# Patient Record
Sex: MALE | Race: WHITE | NOT HISPANIC OR LATINO
[De-identification: names, ages, dates, MRNs, and addresses within clinical notes are randomized per-mention and may not be internally consistent; named-entity substitution may affect disease eponyms.]

---

## 2017-04-20 PROBLEM — Z00.00 ENCOUNTER FOR PREVENTIVE HEALTH EXAMINATION: Status: ACTIVE | Noted: 2017-04-20

## 2017-05-02 ENCOUNTER — APPOINTMENT (OUTPATIENT)
Dept: OTOLARYNGOLOGY | Facility: CLINIC | Age: 62
End: 2017-05-02

## 2017-05-02 VITALS
SYSTOLIC BLOOD PRESSURE: 151 MMHG | DIASTOLIC BLOOD PRESSURE: 97 MMHG | WEIGHT: 190 LBS | HEART RATE: 75 BPM | HEIGHT: 72 IN | BODY MASS INDEX: 25.73 KG/M2

## 2017-05-02 DIAGNOSIS — Z80.0 FAMILY HISTORY OF MALIGNANT NEOPLASM OF DIGESTIVE ORGANS: ICD-10-CM

## 2017-05-02 DIAGNOSIS — Z80.51 FAMILY HISTORY OF MALIGNANT NEOPLASM OF KIDNEY: ICD-10-CM

## 2017-05-02 DIAGNOSIS — Z83.3 FAMILY HISTORY OF DIABETES MELLITUS: ICD-10-CM

## 2017-05-02 DIAGNOSIS — Z87.891 PERSONAL HISTORY OF NICOTINE DEPENDENCE: ICD-10-CM

## 2017-05-02 RX ORDER — ASPIRIN 81 MG
81 TABLET, DELAYED RELEASE (ENTERIC COATED) ORAL
Refills: 0 | Status: ACTIVE | COMMUNITY

## 2017-05-02 RX ORDER — SIMVASTATIN 20 MG/1
20 TABLET, FILM COATED ORAL
Refills: 0 | Status: ACTIVE | COMMUNITY

## 2017-05-06 ENCOUNTER — APPOINTMENT (OUTPATIENT)
Dept: ULTRASOUND IMAGING | Facility: CLINIC | Age: 62
End: 2017-05-06

## 2017-05-06 ENCOUNTER — OUTPATIENT (OUTPATIENT)
Dept: OUTPATIENT SERVICES | Facility: HOSPITAL | Age: 62
LOS: 1 days | End: 2017-05-06
Payer: COMMERCIAL

## 2017-05-06 DIAGNOSIS — E04.2 NONTOXIC MULTINODULAR GOITER: ICD-10-CM

## 2017-05-06 PROCEDURE — 76536 US EXAM OF HEAD AND NECK: CPT

## 2017-06-01 ENCOUNTER — RESULT REVIEW (OUTPATIENT)
Age: 62
End: 2017-06-01

## 2017-06-26 ENCOUNTER — RESULT REVIEW (OUTPATIENT)
Age: 62
End: 2017-06-26

## 2018-06-28 ENCOUNTER — APPOINTMENT (OUTPATIENT)
Dept: OTOLARYNGOLOGY | Facility: CLINIC | Age: 63
End: 2018-06-28
Payer: COMMERCIAL

## 2018-06-28 VITALS
HEART RATE: 65 BPM | SYSTOLIC BLOOD PRESSURE: 133 MMHG | WEIGHT: 195 LBS | BODY MASS INDEX: 26.41 KG/M2 | DIASTOLIC BLOOD PRESSURE: 86 MMHG | HEIGHT: 72 IN

## 2018-06-28 PROCEDURE — 99213 OFFICE O/P EST LOW 20 MIN: CPT

## 2018-07-01 ENCOUNTER — FORM ENCOUNTER (OUTPATIENT)
Age: 63
End: 2018-07-01

## 2018-07-02 ENCOUNTER — APPOINTMENT (OUTPATIENT)
Dept: ULTRASOUND IMAGING | Facility: CLINIC | Age: 63
End: 2018-07-02
Payer: COMMERCIAL

## 2018-07-02 ENCOUNTER — OUTPATIENT (OUTPATIENT)
Dept: OUTPATIENT SERVICES | Facility: HOSPITAL | Age: 63
LOS: 1 days | End: 2018-07-02
Payer: COMMERCIAL

## 2018-07-02 DIAGNOSIS — E04.2 NONTOXIC MULTINODULAR GOITER: ICD-10-CM

## 2018-07-02 PROCEDURE — 76536 US EXAM OF HEAD AND NECK: CPT | Mod: 26

## 2018-07-02 PROCEDURE — 76536 US EXAM OF HEAD AND NECK: CPT

## 2018-07-12 ENCOUNTER — RESULT REVIEW (OUTPATIENT)
Age: 63
End: 2018-07-12

## 2019-06-18 ENCOUNTER — APPOINTMENT (OUTPATIENT)
Dept: OTOLARYNGOLOGY | Facility: CLINIC | Age: 64
End: 2019-06-18
Payer: COMMERCIAL

## 2019-06-18 VITALS
SYSTOLIC BLOOD PRESSURE: 125 MMHG | WEIGHT: 198 LBS | DIASTOLIC BLOOD PRESSURE: 86 MMHG | HEIGHT: 72 IN | HEART RATE: 70 BPM | BODY MASS INDEX: 26.82 KG/M2

## 2019-06-18 DIAGNOSIS — K21.9 GASTRO-ESOPHAGEAL REFLUX DISEASE W/OUT ESOPHAGITIS: ICD-10-CM

## 2019-06-18 PROCEDURE — 99213 OFFICE O/P EST LOW 20 MIN: CPT

## 2019-06-18 RX ORDER — PREDNISONE 20 MG/1
20 TABLET ORAL
Qty: 4 | Refills: 0 | Status: DISCONTINUED | COMMUNITY
Start: 2019-02-27

## 2019-06-18 RX ORDER — CEFUROXIME AXETIL 500 MG/1
500 TABLET ORAL
Qty: 20 | Refills: 0 | Status: DISCONTINUED | COMMUNITY
Start: 2019-05-23

## 2019-06-18 RX ORDER — AMOXICILLIN 875 MG/1
875 TABLET, FILM COATED ORAL
Qty: 20 | Refills: 0 | Status: DISCONTINUED | COMMUNITY
Start: 2019-02-27

## 2019-06-18 NOTE — REASON FOR VISIT
[Subsequent Evaluation] : a subsequent evaluation for [Other: _____] : [unfilled] [FreeTextEntry2] : follow up for annual check up, history of thyroid nodule and laryngopharyngeal reflux, s/p thyroid sonogram 7/02/18.

## 2019-06-18 NOTE — PHYSICAL EXAM
[de-identified] : Thyroid nodule remains nonpalpable [de-identified] : IDL: OP, HP, and larynx WNL with normal VC mobility.  No masses. [Midline] : trachea located in midline position [Normal] : no rashes

## 2019-06-18 NOTE — CONSULT LETTER
[Dear  ___] : Dear  [unfilled], [Courtesy Letter:] : I had the pleasure of seeing your patient, [unfilled], in my office today. [Please see my note below.] : Please see my note below. [Sincerely,] : Sincerely, [Consult Closing:] : Thank you very much for allowing me to participate in the care of this patient.  If you have any questions, please do not hesitate to contact me. [FreeTextEntry2] : Dr. Ranulfo Beth MD [FreeTextEntry3] : Fabian Pa MD, FACS\par Clinical \par Dept. of Otolaryngology\par [facility]\par

## 2019-06-18 NOTE — HISTORY OF PRESENT ILLNESS
[de-identified] : 63 year old male follow up for annual check up, history of thyroid nodule and laryngopharyngeal reflux, s/p thyroid sonogram 7/02/18.  Patient states he is doing well, reports productive cough in the mornings, no significant change from before.  Denies dysphagia, odynophagia, throat pain, bleeding, hemoptysis, dyspnea and fevers.  Continues to maintain diet, tolerating eating and drinking without choking/aspiration.

## 2019-06-21 ENCOUNTER — FORM ENCOUNTER (OUTPATIENT)
Age: 64
End: 2019-06-21

## 2019-06-22 ENCOUNTER — APPOINTMENT (OUTPATIENT)
Dept: ULTRASOUND IMAGING | Facility: CLINIC | Age: 64
End: 2019-06-22
Payer: COMMERCIAL

## 2019-06-22 ENCOUNTER — OUTPATIENT (OUTPATIENT)
Dept: OUTPATIENT SERVICES | Facility: HOSPITAL | Age: 64
LOS: 1 days | End: 2019-06-22
Payer: COMMERCIAL

## 2019-06-22 DIAGNOSIS — Z00.8 ENCOUNTER FOR OTHER GENERAL EXAMINATION: ICD-10-CM

## 2019-06-22 PROCEDURE — 76536 US EXAM OF HEAD AND NECK: CPT | Mod: 26

## 2019-06-22 PROCEDURE — 76536 US EXAM OF HEAD AND NECK: CPT

## 2019-07-09 ENCOUNTER — RESULT REVIEW (OUTPATIENT)
Age: 64
End: 2019-07-09

## 2020-01-02 ENCOUNTER — EMERGENCY (EMERGENCY)
Facility: HOSPITAL | Age: 65
LOS: 1 days | Discharge: HOME | End: 2020-01-02
Attending: EMERGENCY MEDICINE | Admitting: INTERNAL MEDICINE
Payer: COMMERCIAL

## 2020-01-02 VITALS
RESPIRATION RATE: 16 BRPM | TEMPERATURE: 98 F | OXYGEN SATURATION: 97 % | HEIGHT: 72 IN | WEIGHT: 199.96 LBS | DIASTOLIC BLOOD PRESSURE: 105 MMHG | SYSTOLIC BLOOD PRESSURE: 180 MMHG | HEART RATE: 82 BPM

## 2020-01-02 LAB
ALBUMIN SERPL ELPH-MCNC: 3.6 G/DL — SIGNIFICANT CHANGE UP (ref 3.3–5)
ALP SERPL-CCNC: 61 U/L — SIGNIFICANT CHANGE UP (ref 40–120)
ALT FLD-CCNC: 35 U/L — SIGNIFICANT CHANGE UP (ref 12–78)
ANION GAP SERPL CALC-SCNC: 8 MMOL/L — SIGNIFICANT CHANGE UP (ref 5–17)
APTT BLD: 31.6 SEC — SIGNIFICANT CHANGE UP (ref 28.5–37)
AST SERPL-CCNC: 24 U/L — SIGNIFICANT CHANGE UP (ref 15–37)
BASOPHILS # BLD AUTO: 0.04 K/UL — SIGNIFICANT CHANGE UP (ref 0–0.2)
BASOPHILS NFR BLD AUTO: 0.6 % — SIGNIFICANT CHANGE UP (ref 0–2)
BILIRUB SERPL-MCNC: 0.3 MG/DL — SIGNIFICANT CHANGE UP (ref 0.2–1.2)
BUN SERPL-MCNC: 18 MG/DL — SIGNIFICANT CHANGE UP (ref 7–23)
CALCIUM SERPL-MCNC: 8.7 MG/DL — SIGNIFICANT CHANGE UP (ref 8.5–10.1)
CHLORIDE SERPL-SCNC: 112 MMOL/L — HIGH (ref 96–108)
CO2 SERPL-SCNC: 26 MMOL/L — SIGNIFICANT CHANGE UP (ref 22–31)
CREAT SERPL-MCNC: 0.8 MG/DL — SIGNIFICANT CHANGE UP (ref 0.5–1.3)
EOSINOPHIL # BLD AUTO: 0.36 K/UL — SIGNIFICANT CHANGE UP (ref 0–0.5)
EOSINOPHIL NFR BLD AUTO: 5.8 % — SIGNIFICANT CHANGE UP (ref 0–6)
GLUCOSE SERPL-MCNC: 149 MG/DL — HIGH (ref 70–99)
HCT VFR BLD CALC: 42.8 % — SIGNIFICANT CHANGE UP (ref 39–50)
HGB BLD-MCNC: 14.8 G/DL — SIGNIFICANT CHANGE UP (ref 13–17)
IMM GRANULOCYTES NFR BLD AUTO: 0.2 % — SIGNIFICANT CHANGE UP (ref 0–1.5)
INR BLD: 0.97 RATIO — SIGNIFICANT CHANGE UP (ref 0.88–1.16)
LIDOCAIN IGE QN: 110 U/L — SIGNIFICANT CHANGE UP (ref 73–393)
LYMPHOCYTES # BLD AUTO: 2.55 K/UL — SIGNIFICANT CHANGE UP (ref 1–3.3)
LYMPHOCYTES # BLD AUTO: 41.4 % — SIGNIFICANT CHANGE UP (ref 13–44)
MAGNESIUM SERPL-MCNC: 2.1 MG/DL — SIGNIFICANT CHANGE UP (ref 1.6–2.6)
MCHC RBC-ENTMCNC: 29.2 PG — SIGNIFICANT CHANGE UP (ref 27–34)
MCHC RBC-ENTMCNC: 34.6 GM/DL — SIGNIFICANT CHANGE UP (ref 32–36)
MCV RBC AUTO: 84.6 FL — SIGNIFICANT CHANGE UP (ref 80–100)
MONOCYTES # BLD AUTO: 0.6 K/UL — SIGNIFICANT CHANGE UP (ref 0–0.9)
MONOCYTES NFR BLD AUTO: 9.7 % — SIGNIFICANT CHANGE UP (ref 2–14)
NEUTROPHILS # BLD AUTO: 2.6 K/UL — SIGNIFICANT CHANGE UP (ref 1.8–7.4)
NEUTROPHILS NFR BLD AUTO: 42.3 % — LOW (ref 43–77)
NRBC # BLD: 0 /100 WBCS — SIGNIFICANT CHANGE UP (ref 0–0)
PLATELET # BLD AUTO: 182 K/UL — SIGNIFICANT CHANGE UP (ref 150–400)
POTASSIUM SERPL-MCNC: 3.7 MMOL/L — SIGNIFICANT CHANGE UP (ref 3.5–5.3)
POTASSIUM SERPL-SCNC: 3.7 MMOL/L — SIGNIFICANT CHANGE UP (ref 3.5–5.3)
PROT SERPL-MCNC: 6.9 G/DL — SIGNIFICANT CHANGE UP (ref 6–8.3)
PROTHROM AB SERPL-ACNC: 11 SEC — SIGNIFICANT CHANGE UP (ref 10–12.9)
RBC # BLD: 5.06 M/UL — SIGNIFICANT CHANGE UP (ref 4.2–5.8)
RBC # FLD: 13.2 % — SIGNIFICANT CHANGE UP (ref 10.3–14.5)
SODIUM SERPL-SCNC: 146 MMOL/L — HIGH (ref 135–145)
TROPONIN I SERPL-MCNC: <.015 NG/ML — SIGNIFICANT CHANGE UP (ref 0.01–0.04)
WBC # BLD: 6.16 K/UL — SIGNIFICANT CHANGE UP (ref 3.8–10.5)
WBC # FLD AUTO: 6.16 K/UL — SIGNIFICANT CHANGE UP (ref 3.8–10.5)

## 2020-01-02 PROCEDURE — 93010 ELECTROCARDIOGRAM REPORT: CPT

## 2020-01-02 PROCEDURE — 99283 EMERGENCY DEPT VISIT LOW MDM: CPT

## 2020-01-02 PROCEDURE — 70450 CT HEAD/BRAIN W/O DYE: CPT | Mod: 26

## 2020-01-02 PROCEDURE — 71045 X-RAY EXAM CHEST 1 VIEW: CPT | Mod: 26

## 2020-01-02 RX ORDER — SIMVASTATIN 20 MG/1
1 TABLET, FILM COATED ORAL
Qty: 0 | Refills: 0 | DISCHARGE

## 2020-01-02 RX ORDER — ASPIRIN/CALCIUM CARB/MAGNESIUM 324 MG
1 TABLET ORAL
Qty: 0 | Refills: 0 | DISCHARGE

## 2020-01-02 RX ORDER — SODIUM CHLORIDE 9 MG/ML
3 INJECTION INTRAMUSCULAR; INTRAVENOUS; SUBCUTANEOUS ONCE
Refills: 0 | Status: COMPLETED | OUTPATIENT
Start: 2020-01-02 | End: 2020-01-02

## 2020-01-02 RX ADMIN — SODIUM CHLORIDE 3 MILLILITER(S): 9 INJECTION INTRAMUSCULAR; INTRAVENOUS; SUBCUTANEOUS at 22:56

## 2020-01-02 NOTE — ED PROVIDER NOTE - CARE PROVIDER_API CALL
Orlando Prince)  Cardiovascular Disease  [address], [address] [place] [address]  Phone: [phone]  Fax: [phone]  Follow Up Time: 1-3 Days

## 2020-01-02 NOTE — ED PROVIDER NOTE - CARDIAC, MLM
Normal rate, regular rhythm.  Heart sounds S1, S2.  No murmurs, rubs or gallops. No reproducible TTP on exam, no skin changes noted

## 2020-01-02 NOTE — ED PROVIDER NOTE - CARE PLAN
Principal Discharge DX:	Nonspecific chest pain  Secondary Diagnosis:	Headache  Secondary Diagnosis:	HTN (hypertension)

## 2020-01-02 NOTE — ED PROVIDER NOTE - PROGRESS NOTE DETAILS
The patients headache resolved BP after Norvasc 5mg is  155/93, Plan discharge and f/u with cardiology

## 2020-01-02 NOTE — ED PROVIDER NOTE - CLINICAL SUMMARY MEDICAL DECISION MAKING FREE TEXT BOX
Pt is a 65 yo male who presents to the ED with a cc of chest pain.  PMHx of HLD.  Pt reports that he has no CAD.  Over the last week he has been experiencing intermittent left sided chest pain.  Symptoms were self limited and he reports that he noted them mainly at rest not with exertion.  Pt believed that they were related to a chest wall strain due to the fact that he had similar pain in the past.  Denies any heavy lifting or trauma.  He reports that tonight around 6 pm he noted the pain again but this time it did not resolve.  He then reports that he developed a mild generalized HA.  he became concerned when he developed the HA and when he check his BP he noted that it was elevated with his systolic in the 170s and his systolic in the 100s.  Pt does not have a history of HTN.  Pt became concerned and came to the ED for further work up.  Denies fever, chills, N/V, SOB, abd pain.  Denies visual changes.  Pt reports that the pain is increased with movement and with deep breathing.  Denies any recent travel or long car rides.  Denies calf pain or swelling. Pt is a 63 yo male who presents to the ED with a cc of chest pain.  PMHx of HLD.  Pt reports that he has no CAD.  Over the last week he has been experiencing intermittent left sided chest pain.  Symptoms were self limited and he reports that he noted them mainly at rest not with exertion.  Pt believed that they were related to a chest wall strain due to the fact that he had similar pain in the past.  Denies any heavy lifting or trauma.  He reports that tonight around 6 pm he noted the pain again but this time it did not resolve.  He then reports that he developed a mild generalized HA.  he became concerned when he developed the HA and when he check his BP he noted that it was elevated with his systolic in the 170s and his systolic in the 100s.  Pt does not have a history of HTN.  Pt became concerned and came to the ED for further work up.  Denies fever, chills, N/V, SOB, abd pain.  Denies visual changes.  Pt reports that the pain is increased with movement and with deep breathing.  Denies any recent travel or long car rides.  Denies calf pain or swelling.  Concern for underlying cardiac abnormality will obtain screening labs, control BP, obtain EKG and monitor.  Will check CT head due to HA

## 2020-01-02 NOTE — ED ADULT NURSE NOTE - OBJECTIVE STATEMENT
Present to ER with c/o of chest pain and head ache since 6pm. Denies any trauma in the area. Denies any shortness of breath.

## 2020-01-02 NOTE — ED PROVIDER NOTE - PATIENT PORTAL LINK FT
You can access the FollowMyHealth Patient Portal offered by [facility] by registering at the following website: [email or web address]. By joining [employer]’s FollowMyHealth portal, you will also be able to view your health information using other applications (apps) compatible with our system.

## 2020-01-02 NOTE — ED PROVIDER NOTE - OBJECTIVE STATEMENT
Pt is a 65 yo male who presents to the ED with a cc of chest pain.  PMHx of HLD.  Pt reports that he has no CAD.  Over the last week he has been experiencing intermittent left sided chest pain.  Symptoms were self limited and he reports that he noted them mainly at rest not with exertion.  Pt believed that they were related to a chest wall strain due to the fact that he had similar pain in the past.  Denies any heavy lifting or trauma.  He reports that tonight around 6 pm he noted the pain again but this time it did not resolve.  He then reports that he developed a mild generalized HA.  he became concerned when he developed the HA and when he check his BP he noted that it was elevated with his systolic in the 170s and his systolic in the 100s.  Pt does not have a history of HTN.  Pt became concerned and came to the ED for further work up.  Denies fever, chills, N/V, SOB, abd pain.  Denies visual changes.  Pt reports that the pain is increased with movement and with deep breathing.  Denies any recent travel or long car rides.  Denies calf pain or swelling.

## 2020-01-03 VITALS
SYSTOLIC BLOOD PRESSURE: 155 MMHG | HEART RATE: 79 BPM | TEMPERATURE: 98 F | RESPIRATION RATE: 16 BRPM | OXYGEN SATURATION: 96 % | DIASTOLIC BLOOD PRESSURE: 93 MMHG

## 2020-01-03 LAB
D DIMER BLD IA.RAPID-MCNC: <150 NG/ML DDU — SIGNIFICANT CHANGE UP
TROPONIN I SERPL-MCNC: <.015 NG/ML — SIGNIFICANT CHANGE UP (ref 0.01–0.04)

## 2020-01-03 PROCEDURE — 96374 THER/PROPH/DIAG INJ IV PUSH: CPT

## 2020-01-03 PROCEDURE — 85027 COMPLETE CBC AUTOMATED: CPT

## 2020-01-03 PROCEDURE — 36415 COLL VENOUS BLD VENIPUNCTURE: CPT

## 2020-01-03 PROCEDURE — 83735 ASSAY OF MAGNESIUM: CPT

## 2020-01-03 PROCEDURE — 71045 X-RAY EXAM CHEST 1 VIEW: CPT

## 2020-01-03 PROCEDURE — 84484 ASSAY OF TROPONIN QUANT: CPT

## 2020-01-03 PROCEDURE — 80053 COMPREHEN METABOLIC PANEL: CPT

## 2020-01-03 PROCEDURE — 83690 ASSAY OF LIPASE: CPT

## 2020-01-03 PROCEDURE — 85610 PROTHROMBIN TIME: CPT

## 2020-01-03 PROCEDURE — 70450 CT HEAD/BRAIN W/O DYE: CPT

## 2020-01-03 PROCEDURE — 93005 ELECTROCARDIOGRAM TRACING: CPT

## 2020-01-03 PROCEDURE — 85730 THROMBOPLASTIN TIME PARTIAL: CPT

## 2020-01-03 PROCEDURE — 99284 EMERGENCY DEPT VISIT MOD MDM: CPT | Mod: 25

## 2020-01-03 PROCEDURE — 85379 FIBRIN DEGRADATION QUANT: CPT

## 2020-01-03 RX ORDER — AMLODIPINE BESYLATE 2.5 MG/1
5 TABLET ORAL ONCE
Refills: 0 | Status: COMPLETED | OUTPATIENT
Start: 2020-01-03 | End: 2020-01-03

## 2020-01-03 RX ORDER — KETOROLAC TROMETHAMINE 30 MG/ML
30 SYRINGE (ML) INJECTION ONCE
Refills: 0 | Status: DISCONTINUED | OUTPATIENT
Start: 2020-01-03 | End: 2020-01-03

## 2020-01-03 RX ADMIN — Medication 30 MILLIGRAM(S): at 00:52

## 2020-01-03 RX ADMIN — Medication 30 MILLIGRAM(S): at 01:15

## 2020-01-03 RX ADMIN — AMLODIPINE BESYLATE 5 MILLIGRAM(S): 2.5 TABLET ORAL at 02:33

## 2020-01-03 NOTE — ED ADULT NURSE REASSESSMENT NOTE - NS ED NURSE REASSESS COMMENT FT1
Patient is aware of pending CT results and troponin redraw at 2AM.  Patient verbalizes his anxiety and denies being on anti-hypertensive medications at home but does admit to high salt intake over the past few days.  Stretcher in the lowest position with family member at the bedside.  Safety maintained.

## 2021-06-14 PROBLEM — E78.00 PURE HYPERCHOLESTEROLEMIA, UNSPECIFIED: Chronic | Status: ACTIVE | Noted: 2020-01-02

## 2021-06-15 ENCOUNTER — APPOINTMENT (OUTPATIENT)
Dept: OTOLARYNGOLOGY | Facility: CLINIC | Age: 66
End: 2021-06-15
Payer: COMMERCIAL

## 2021-06-15 VITALS
BODY MASS INDEX: 25.06 KG/M2 | SYSTOLIC BLOOD PRESSURE: 133 MMHG | WEIGHT: 185 LBS | HEIGHT: 72 IN | DIASTOLIC BLOOD PRESSURE: 84 MMHG | HEART RATE: 76 BPM

## 2021-06-15 DIAGNOSIS — E04.2 NONTOXIC MULTINODULAR GOITER: ICD-10-CM

## 2021-06-15 DIAGNOSIS — K09.8 OTHER CYSTS OF ORAL REGION, NOT ELSEWHERE CLASSIFIED: ICD-10-CM

## 2021-06-15 PROCEDURE — 99072 ADDL SUPL MATRL&STAF TM PHE: CPT

## 2021-06-15 PROCEDURE — 99214 OFFICE O/P EST MOD 30 MIN: CPT

## 2021-06-15 NOTE — CONSULT LETTER
[Dear  ___] : Dear  [unfilled], [Courtesy Letter:] : I had the pleasure of seeing your patient, [unfilled], in my office today. [Please see my note below.] : Please see my note below. [Sincerely,] : Sincerely, [DrJustin  ___] : Dr. SEVERINO [FreeTextEntry2] : Ranulfo Beth, DO [FreeTextEntry3] : Fabian Pa MD, FACS\par Chief of Otolaryngology at [facility]\par \par Dept. of Otolaryngology\par [facility]\par

## 2021-06-15 NOTE — PHYSICAL EXAM
[Midline] : trachea located in midline position [Normal] : no rashes [de-identified] : Thyroid nodule remains nonpalpable [de-identified] : 2 small cystic lesions (1 to 2 mm) in L RMT region.

## 2021-06-15 NOTE — HISTORY OF PRESENT ILLNESS
[de-identified] : 65 year old male follow up for thyroid nodule, and two vesicles in left side of mouth.  No imaging conducted since June 2019 for thyroid nodule.  Patient denies dysphagia, odynophagia, dyspnea, dysphonia or otalgia. States has 2 vesicles in the left side of the mouth behind molars.  States comes and goes, has been present for about 2 months.  Sometimes gets sore when it gets irritated, then shrinks and returns.

## 2021-06-21 ENCOUNTER — OUTPATIENT (OUTPATIENT)
Dept: OUTPATIENT SERVICES | Facility: HOSPITAL | Age: 66
LOS: 1 days | End: 2021-06-21
Payer: COMMERCIAL

## 2021-06-21 ENCOUNTER — APPOINTMENT (OUTPATIENT)
Dept: ULTRASOUND IMAGING | Facility: CLINIC | Age: 66
End: 2021-06-21
Payer: COMMERCIAL

## 2021-06-21 ENCOUNTER — RESULT REVIEW (OUTPATIENT)
Age: 66
End: 2021-06-21

## 2021-06-21 DIAGNOSIS — Z00.8 ENCOUNTER FOR OTHER GENERAL EXAMINATION: ICD-10-CM

## 2021-06-21 DIAGNOSIS — E04.2 NONTOXIC MULTINODULAR GOITER: ICD-10-CM

## 2021-06-21 PROCEDURE — 76536 US EXAM OF HEAD AND NECK: CPT

## 2021-06-21 PROCEDURE — 76536 US EXAM OF HEAD AND NECK: CPT | Mod: 26

## 2021-06-22 ENCOUNTER — APPOINTMENT (OUTPATIENT)
Dept: OTOLARYNGOLOGY | Facility: CLINIC | Age: 66
End: 2021-06-22

## 2021-07-17 ENCOUNTER — NON-APPOINTMENT (OUTPATIENT)
Age: 66
End: 2021-07-17

## 2021-11-21 NOTE — ED ADULT NURSE NOTE - NSFALLRSKUNASSIST_ED_ALL_ED
Martell is a 19 year old who is being evaluated via a billable telephone visit.      Assessment & Plan     Diarrhea, unspecified type    Symptoms have resolved and patient never had evidence consistent with Covid. He can return to work without further evaluation.     Janett Jerez PA-C  Virtual Urgent Care  [facility] VIRTUAL URGENT CARE    Subjective   Martell is a 19 year old who presents for the following health issues : Covid concern    HPI - Patient is having a telephone visit due to upset stomach and diarrhea. He had diarrhea the last few days. He has not had fevers, chills or cold symptoms. He called in to work due to stomach upset. He says his symptoms have resolved.     Review of Systems   Constitutional, HEENT, cardiovascular, pulmonary, gi and gu systems are negative, except as otherwise noted.      Objective           Vitals:  No vitals were obtained today due to virtual visit.    Physical Exam   healthy, alert and no distress  PSYCH: Alert and oriented times 3; coherent speech, normal   rate and volume, able to articulate logical thoughts, able   to abstract reason, no tangential thoughts, no hallucinations   or delusions  His affect is normal  RESP: No cough, no audible wheezing, able to talk in full sentences  Remainder of exam unable to be completed due to telephone visits      Phone call duration: 6 minutes     no

## 2022-10-02 ENCOUNTER — OFFICE (OUTPATIENT)
Dept: URBAN - METROPOLITAN AREA CLINIC 109 | Facility: CLINIC | Age: 67
Setting detail: OPHTHALMOLOGY
End: 2022-10-02
Payer: COMMERCIAL

## 2022-10-02 DIAGNOSIS — H02.831: ICD-10-CM

## 2022-10-02 DIAGNOSIS — H02.834: ICD-10-CM

## 2022-10-02 DIAGNOSIS — H02.832: ICD-10-CM

## 2022-10-02 DIAGNOSIS — H10.32: ICD-10-CM

## 2022-10-02 DIAGNOSIS — H02.835: ICD-10-CM

## 2022-10-02 PROCEDURE — 92002 INTRM OPH EXAM NEW PATIENT: CPT | Performed by: OPHTHALMOLOGY

## 2022-10-02 ASSESSMENT — LID EXAM ASSESSMENTS: OS_COMMENTS: NO FB WITH LID EVERTED

## 2022-10-02 ASSESSMENT — LID POSITION - DERMATOCHALASIS
OD_DERMATOCHALASIS: 1+
OS_DERMATOCHALASIS: 1+

## 2022-10-02 ASSESSMENT — CONFRONTATIONAL VISUAL FIELD TEST (CVF)
OS_FINDINGS: FULL
OD_FINDINGS: FULL

## 2022-10-02 ASSESSMENT — VISUAL ACUITY
OD_BCVA: 20/20
OS_BCVA: 20/20-1

## 2022-10-05 ENCOUNTER — RX ONLY (RX ONLY)
Age: 67
End: 2022-10-05

## 2022-10-05 ENCOUNTER — OFFICE (OUTPATIENT)
Dept: URBAN - METROPOLITAN AREA CLINIC 109 | Facility: CLINIC | Age: 67
Setting detail: OPHTHALMOLOGY
End: 2022-10-05
Payer: COMMERCIAL

## 2022-10-05 DIAGNOSIS — H02.832: ICD-10-CM

## 2022-10-05 DIAGNOSIS — H10.32: ICD-10-CM

## 2022-10-05 DIAGNOSIS — H02.834: ICD-10-CM

## 2022-10-05 DIAGNOSIS — H40.053: ICD-10-CM

## 2022-10-05 DIAGNOSIS — H02.831: ICD-10-CM

## 2022-10-05 DIAGNOSIS — H02.835: ICD-10-CM

## 2022-10-05 PROCEDURE — 99213 OFFICE O/P EST LOW 20 MIN: CPT | Performed by: OPHTHALMOLOGY

## 2022-10-05 ASSESSMENT — VISUAL ACUITY
OD_BCVA: 20/20
OS_BCVA: 20/20-1

## 2022-10-05 ASSESSMENT — LID POSITION - DERMATOCHALASIS
OS_DERMATOCHALASIS: 1+
OD_DERMATOCHALASIS: 1+

## 2022-10-05 ASSESSMENT — CONFRONTATIONAL VISUAL FIELD TEST (CVF)
OD_FINDINGS: FULL
OS_FINDINGS: FULL

## 2022-10-26 ENCOUNTER — OFFICE (OUTPATIENT)
Dept: URBAN - METROPOLITAN AREA CLINIC 109 | Facility: CLINIC | Age: 67
Setting detail: OPHTHALMOLOGY
End: 2022-10-26
Payer: COMMERCIAL

## 2022-10-26 DIAGNOSIS — H02.834: ICD-10-CM

## 2022-10-26 DIAGNOSIS — H10.32: ICD-10-CM

## 2022-10-26 DIAGNOSIS — H02.832: ICD-10-CM

## 2022-10-26 DIAGNOSIS — H02.831: ICD-10-CM

## 2022-10-26 DIAGNOSIS — H40.053: ICD-10-CM

## 2022-10-26 DIAGNOSIS — H02.835: ICD-10-CM

## 2022-10-26 PROCEDURE — 76514 ECHO EXAM OF EYE THICKNESS: CPT | Performed by: OPHTHALMOLOGY

## 2022-10-26 PROCEDURE — 99213 OFFICE O/P EST LOW 20 MIN: CPT | Performed by: OPHTHALMOLOGY

## 2022-10-26 PROCEDURE — 92133 CPTRZD OPH DX IMG PST SGM ON: CPT | Performed by: OPHTHALMOLOGY

## 2022-10-26 PROCEDURE — 92020 GONIOSCOPY: CPT | Performed by: OPHTHALMOLOGY

## 2022-10-26 PROCEDURE — 92083 EXTENDED VISUAL FIELD XM: CPT | Performed by: OPHTHALMOLOGY

## 2022-10-26 ASSESSMENT — REFRACTION_CURRENTRX
OD_SPHERE: -1.00
OD_OVR_VA: 20/
OS_OVR_VA: 20/
OS_SPHERE: -1.00

## 2022-10-26 ASSESSMENT — REFRACTION_AUTOREFRACTION
OS_AXIS: 88
OS_SPHERE: -1.25
OS_CYLINDER: -1.50
OD_SPHERE: -0.25
OD_AXIS: 98
OD_CYLINDER: -2.00

## 2022-10-26 ASSESSMENT — LID POSITION - DERMATOCHALASIS
OD_DERMATOCHALASIS: 1+
OS_DERMATOCHALASIS: 1+

## 2022-10-26 ASSESSMENT — PACHYMETRY
OS_CT_CORRECTION: -3
OS_CT_UM: 582
OD_CT_UM: 585
OD_CT_CORRECTION: -3

## 2022-10-26 ASSESSMENT — VISUAL ACUITY
OD_BCVA: 20/20-1
OS_BCVA: 20/20-1

## 2022-10-26 ASSESSMENT — CONFRONTATIONAL VISUAL FIELD TEST (CVF)
OD_FINDINGS: FULL
OS_FINDINGS: FULL

## 2022-10-26 ASSESSMENT — SPHEQUIV_DERIVED
OS_SPHEQUIV: -2
OD_SPHEQUIV: -1.25

## 2022-11-08 ENCOUNTER — OFFICE (OUTPATIENT)
Dept: URBAN - METROPOLITAN AREA CLINIC 109 | Facility: CLINIC | Age: 67
Setting detail: OPHTHALMOLOGY
End: 2022-11-08
Payer: COMMERCIAL

## 2022-11-08 DIAGNOSIS — H40.033: ICD-10-CM

## 2022-11-08 DIAGNOSIS — H40.053: ICD-10-CM

## 2022-11-08 PROCEDURE — 99213 OFFICE O/P EST LOW 20 MIN: CPT | Performed by: OPHTHALMOLOGY

## 2022-11-08 PROCEDURE — 92020 GONIOSCOPY: CPT | Performed by: OPHTHALMOLOGY

## 2022-11-08 ASSESSMENT — CONFRONTATIONAL VISUAL FIELD TEST (CVF)
OS_FINDINGS: FULL
OD_FINDINGS: FULL

## 2022-11-08 ASSESSMENT — VISUAL ACUITY
OS_BCVA: 20/20-1
OD_BCVA: 20/25

## 2022-11-08 ASSESSMENT — REFRACTION_CURRENTRX
OD_OVR_VA: 20/
OS_SPHERE: -1.00
OD_SPHERE: -1.00
OS_OVR_VA: 20/

## 2022-11-08 ASSESSMENT — REFRACTION_AUTOREFRACTION
OS_CYLINDER: -1.50
OD_SPHERE: -0.25
OS_SPHERE: -1.25
OD_AXIS: 98
OS_AXIS: 88
OD_CYLINDER: -2.00

## 2022-11-08 ASSESSMENT — SPHEQUIV_DERIVED
OS_SPHEQUIV: -2
OD_SPHEQUIV: -1.25

## 2022-11-08 ASSESSMENT — LID POSITION - DERMATOCHALASIS
OS_DERMATOCHALASIS: 1+
OD_DERMATOCHALASIS: 1+

## 2022-11-08 ASSESSMENT — PACHYMETRY
OS_CT_CORRECTION: -3
OD_CT_UM: 585
OD_CT_CORRECTION: -3
OS_CT_UM: 582

## 2022-11-08 ASSESSMENT — TONOMETRY: OD_IOP_MMHG: 17

## 2022-11-21 ENCOUNTER — OFFICE (OUTPATIENT)
Dept: URBAN - METROPOLITAN AREA CLINIC 109 | Facility: CLINIC | Age: 67
Setting detail: OPHTHALMOLOGY
End: 2022-11-21
Payer: COMMERCIAL

## 2022-11-21 DIAGNOSIS — H40.031: ICD-10-CM

## 2022-11-21 PROCEDURE — 66761 REVISION OF IRIS: CPT | Performed by: OPHTHALMOLOGY

## 2022-11-21 ASSESSMENT — REFRACTION_CURRENTRX
OD_SPHERE: -1.00
OS_OVR_VA: 20/
OD_OVR_VA: 20/
OS_SPHERE: -1.00

## 2022-11-21 ASSESSMENT — PACHYMETRY
OS_CT_CORRECTION: -3
OD_CT_UM: 585
OD_CT_CORRECTION: -3
OS_CT_UM: 582

## 2022-11-21 ASSESSMENT — SPHEQUIV_DERIVED
OS_SPHEQUIV: -2
OD_SPHEQUIV: -1.25

## 2022-11-21 ASSESSMENT — VISUAL ACUITY
OD_BCVA: 20/25
OS_BCVA: 20/20-1

## 2022-11-21 ASSESSMENT — LID POSITION - DERMATOCHALASIS
OS_DERMATOCHALASIS: 1+
OD_DERMATOCHALASIS: 1+

## 2022-11-21 ASSESSMENT — TONOMETRY: OD_IOP_MMHG: 18

## 2022-11-21 ASSESSMENT — REFRACTION_AUTOREFRACTION
OD_AXIS: 98
OD_CYLINDER: -2.00
OS_SPHERE: -1.25
OD_SPHERE: -0.25
OS_AXIS: 88
OS_CYLINDER: -1.50

## 2022-11-21 ASSESSMENT — CONFRONTATIONAL VISUAL FIELD TEST (CVF)
OS_FINDINGS: FULL
OD_FINDINGS: FULL

## 2022-11-28 ENCOUNTER — OFFICE (OUTPATIENT)
Dept: URBAN - METROPOLITAN AREA CLINIC 109 | Facility: CLINIC | Age: 67
Setting detail: OPHTHALMOLOGY
End: 2022-11-28
Payer: COMMERCIAL

## 2022-11-28 DIAGNOSIS — H40.032: ICD-10-CM

## 2022-11-28 PROBLEM — H02.834 DERMATOCHALASIS; RIGHT UPPER LID, RIGHT LOWER LID, LEFT UPPER LID, LEFT LOWER LID: Status: ACTIVE | Noted: 2022-10-05

## 2022-11-28 PROBLEM — H02.832 DERMATOCHALASIS; RIGHT UPPER LID, RIGHT LOWER LID, LEFT UPPER LID, LEFT LOWER LID: Status: ACTIVE | Noted: 2022-10-05

## 2022-11-28 PROBLEM — H10.32 CONJUNCTIVITS ACUTE UNSPECIFIED; LEFT EYE: Status: ACTIVE | Noted: 2022-10-02

## 2022-11-28 PROBLEM — H02.831 DERMATOCHALASIS; RIGHT UPPER LID, RIGHT LOWER LID, LEFT UPPER LID, LEFT LOWER LID: Status: ACTIVE | Noted: 2022-10-05

## 2022-11-28 PROBLEM — H40.031 NARROW ANGLE; RIGHT EYE, LEFT EYE: Status: ACTIVE | Noted: 2022-11-21

## 2022-11-28 PROBLEM — H02.835 DERMATOCHALASIS; RIGHT UPPER LID, RIGHT LOWER LID, LEFT UPPER LID, LEFT LOWER LID: Status: ACTIVE | Noted: 2022-10-05

## 2022-11-28 PROBLEM — H25.13 CATARACT SENILE NUCLEAR SCLEROSIS; BOTH EYES: Status: ACTIVE | Noted: 2022-11-08

## 2022-11-28 PROCEDURE — 66761 REVISION OF IRIS: CPT | Performed by: OPHTHALMOLOGY

## 2022-11-28 ASSESSMENT — CONFRONTATIONAL VISUAL FIELD TEST (CVF)
OD_FINDINGS: FULL
OS_FINDINGS: FULL

## 2022-11-28 ASSESSMENT — PACHYMETRY
OD_CT_CORRECTION: -3
OS_CT_UM: 582
OD_CT_UM: 585
OS_CT_CORRECTION: -3

## 2022-11-28 ASSESSMENT — REFRACTION_AUTOREFRACTION
OD_AXIS: 98
OD_SPHERE: -0.25
OD_CYLINDER: -2.00
OS_SPHERE: -1.25
OS_CYLINDER: -1.50
OS_AXIS: 88

## 2022-11-28 ASSESSMENT — VISUAL ACUITY
OS_BCVA: 20/20-1
OD_BCVA: 20/25

## 2022-11-28 ASSESSMENT — REFRACTION_CURRENTRX
OD_SPHERE: -1.00
OS_SPHERE: -1.00
OS_OVR_VA: 20/
OD_OVR_VA: 20/

## 2022-11-28 ASSESSMENT — LID POSITION - DERMATOCHALASIS
OD_DERMATOCHALASIS: 1+
OS_DERMATOCHALASIS: 1+

## 2022-11-28 ASSESSMENT — SPHEQUIV_DERIVED
OS_SPHEQUIV: -2
OD_SPHEQUIV: -1.25

## 2022-11-28 ASSESSMENT — TONOMETRY: OS_IOP_MMHG: 15

## 2023-01-03 ENCOUNTER — OFFICE (OUTPATIENT)
Dept: URBAN - METROPOLITAN AREA CLINIC 109 | Facility: CLINIC | Age: 68
Setting detail: OPHTHALMOLOGY
End: 2023-01-03
Payer: COMMERCIAL

## 2023-01-03 DIAGNOSIS — H40.033: ICD-10-CM

## 2023-01-03 PROCEDURE — 92020 GONIOSCOPY: CPT | Performed by: OPHTHALMOLOGY

## 2023-01-03 PROCEDURE — 99213 OFFICE O/P EST LOW 20 MIN: CPT | Performed by: OPHTHALMOLOGY

## 2023-01-03 ASSESSMENT — SPHEQUIV_DERIVED
OD_SPHEQUIV: -1.25
OS_SPHEQUIV: -2

## 2023-01-03 ASSESSMENT — CONFRONTATIONAL VISUAL FIELD TEST (CVF)
OS_FINDINGS: FULL
OD_FINDINGS: FULL

## 2023-01-03 ASSESSMENT — REFRACTION_AUTOREFRACTION
OS_SPHERE: -1.25
OS_CYLINDER: -1.50
OD_CYLINDER: -2.00
OD_SPHERE: -0.25
OS_AXIS: 88
OD_AXIS: 98

## 2023-01-03 ASSESSMENT — VISUAL ACUITY
OS_BCVA: 20/25
OD_BCVA: 20/20-2

## 2023-01-03 ASSESSMENT — PACHYMETRY
OS_CT_CORRECTION: -3
OS_CT_UM: 582
OD_CT_UM: 585
OD_CT_CORRECTION: -3

## 2023-01-03 ASSESSMENT — REFRACTION_CURRENTRX
OS_SPHERE: -1.00
OS_OVR_VA: 20/
OD_SPHERE: -1.00
OD_OVR_VA: 20/

## 2023-01-03 ASSESSMENT — LID POSITION - DERMATOCHALASIS
OS_DERMATOCHALASIS: 1+
OD_DERMATOCHALASIS: 1+

## 2023-01-17 ENCOUNTER — OFFICE (OUTPATIENT)
Dept: URBAN - METROPOLITAN AREA CLINIC 109 | Facility: CLINIC | Age: 68
Setting detail: OPHTHALMOLOGY
End: 2023-01-17
Payer: COMMERCIAL

## 2023-01-17 ENCOUNTER — RX ONLY (RX ONLY)
Age: 68
End: 2023-01-17

## 2023-01-17 DIAGNOSIS — H40.033: ICD-10-CM

## 2023-01-17 PROCEDURE — 92250 FUNDUS PHOTOGRAPHY W/I&R: CPT | Performed by: OPHTHALMOLOGY

## 2023-01-17 PROCEDURE — 99213 OFFICE O/P EST LOW 20 MIN: CPT | Performed by: OPHTHALMOLOGY

## 2023-01-17 ASSESSMENT — REFRACTION_AUTOREFRACTION
OS_CYLINDER: -1.50
OD_SPHERE: -0.25
OD_AXIS: 98
OD_CYLINDER: -2.00
OS_SPHERE: -1.25
OS_AXIS: 88

## 2023-01-17 ASSESSMENT — PACHYMETRY
OS_CT_UM: 582
OD_CT_CORRECTION: -3
OD_CT_UM: 585
OS_CT_CORRECTION: -3

## 2023-01-17 ASSESSMENT — REFRACTION_CURRENTRX
OD_SPHERE: -1.00
OD_OVR_VA: 20/
OS_OVR_VA: 20/
OS_SPHERE: -1.00

## 2023-01-17 ASSESSMENT — SPHEQUIV_DERIVED
OS_SPHEQUIV: -2
OD_SPHEQUIV: -1.25

## 2023-01-17 ASSESSMENT — TONOMETRY
OS_IOP_MMHG: 17
OD_IOP_MMHG: 17

## 2023-01-17 ASSESSMENT — LID POSITION - DERMATOCHALASIS
OD_DERMATOCHALASIS: 1+
OS_DERMATOCHALASIS: 1+

## 2023-01-17 ASSESSMENT — CONFRONTATIONAL VISUAL FIELD TEST (CVF)
OS_FINDINGS: FULL
OD_FINDINGS: FULL

## 2023-01-17 ASSESSMENT — VISUAL ACUITY
OD_BCVA: 20/20-1
OS_BCVA: 20/25

## 2023-02-28 ENCOUNTER — OFFICE (OUTPATIENT)
Dept: URBAN - METROPOLITAN AREA CLINIC 109 | Facility: CLINIC | Age: 68
Setting detail: OPHTHALMOLOGY
End: 2023-02-28
Payer: COMMERCIAL

## 2023-02-28 DIAGNOSIS — H02.835: ICD-10-CM

## 2023-02-28 DIAGNOSIS — H25.13: ICD-10-CM

## 2023-02-28 DIAGNOSIS — H02.831: ICD-10-CM

## 2023-02-28 DIAGNOSIS — H40.053: ICD-10-CM

## 2023-02-28 DIAGNOSIS — H40.033: ICD-10-CM

## 2023-02-28 DIAGNOSIS — H02.832: ICD-10-CM

## 2023-02-28 DIAGNOSIS — H02.834: ICD-10-CM

## 2023-02-28 PROCEDURE — 92012 INTRM OPH EXAM EST PATIENT: CPT | Performed by: OPHTHALMOLOGY

## 2023-02-28 ASSESSMENT — REFRACTION_MANIFEST
OD_VA1: 20/20
OD_SPHERE: -1.00
OS_SPHERE: -1.00
OS_VA1: 20/25

## 2023-02-28 ASSESSMENT — REFRACTION_CURRENTRX
OS_OVR_VA: 20/
OD_SPHERE: -1.00
OS_SPHERE: -1.00
OD_OVR_VA: 20/

## 2023-02-28 ASSESSMENT — CONFRONTATIONAL VISUAL FIELD TEST (CVF)
OS_FINDINGS: FULL
OD_FINDINGS: FULL

## 2023-02-28 ASSESSMENT — REFRACTION_AUTOREFRACTION
OS_AXIS: 88
OS_CYLINDER: -1.50
OD_AXIS: 98
OS_SPHERE: -1.25
OD_CYLINDER: -2.00
OD_SPHERE: -0.25

## 2023-02-28 ASSESSMENT — VISUAL ACUITY
OS_BCVA: 20/25
OD_BCVA: 20/20-1

## 2023-02-28 ASSESSMENT — LID POSITION - DERMATOCHALASIS
OS_DERMATOCHALASIS: 1+
OD_DERMATOCHALASIS: 1+

## 2023-02-28 ASSESSMENT — SPHEQUIV_DERIVED
OS_SPHEQUIV: -2
OD_SPHEQUIV: -1.25

## 2023-04-11 ENCOUNTER — OFFICE (OUTPATIENT)
Dept: URBAN - METROPOLITAN AREA CLINIC 109 | Facility: CLINIC | Age: 68
Setting detail: OPHTHALMOLOGY
End: 2023-04-11
Payer: COMMERCIAL

## 2023-04-11 DIAGNOSIS — H02.831: ICD-10-CM

## 2023-04-11 DIAGNOSIS — H02.835: ICD-10-CM

## 2023-04-11 DIAGNOSIS — H02.832: ICD-10-CM

## 2023-04-11 DIAGNOSIS — H25.13: ICD-10-CM

## 2023-04-11 DIAGNOSIS — H40.053: ICD-10-CM

## 2023-04-11 DIAGNOSIS — H02.834: ICD-10-CM

## 2023-04-11 DIAGNOSIS — H40.033: ICD-10-CM

## 2023-04-11 PROCEDURE — 99213 OFFICE O/P EST LOW 20 MIN: CPT | Performed by: OPHTHALMOLOGY

## 2023-04-11 ASSESSMENT — VISUAL ACUITY
OS_BCVA: 20/20-2
OD_BCVA: 20/30+1

## 2023-04-11 ASSESSMENT — SPHEQUIV_DERIVED
OS_SPHEQUIV: -2.375
OD_SPHEQUIV: -1.125

## 2023-04-11 ASSESSMENT — REFRACTION_MANIFEST
OD_VA1: 20/20
OD_SPHERE: -1.00
OS_VA1: 20/25
OS_SPHERE: -1.00

## 2023-04-11 ASSESSMENT — REFRACTION_CURRENTRX
OS_CYLINDER: -0.25
OS_OVR_VA: 20/
OS_AXIS: 55
OD_OVR_VA: 20/
OD_CYLINDER: -0.25
OS_SPHERE: -1.00
OD_AXIS: 48
OD_SPHERE: -1.00

## 2023-04-11 ASSESSMENT — LID POSITION - DERMATOCHALASIS
OD_DERMATOCHALASIS: 1+
OS_DERMATOCHALASIS: 1+

## 2023-04-11 ASSESSMENT — PACHYMETRY
OS_CT_CORRECTION: -3
OD_CT_CORRECTION: -3
OS_CT_UM: 582
OD_CT_UM: 585

## 2023-04-11 ASSESSMENT — REFRACTION_AUTOREFRACTION
OD_CYLINDER: -1.75
OD_SPHERE: -0.25
OS_AXIS: 94
OS_SPHERE: -1.75
OS_CYLINDER: -1.25
OD_AXIS: 101

## 2023-04-11 ASSESSMENT — CONFRONTATIONAL VISUAL FIELD TEST (CVF)
OS_FINDINGS: FULL
OD_FINDINGS: FULL

## 2023-04-18 ENCOUNTER — OFFICE (OUTPATIENT)
Dept: URBAN - METROPOLITAN AREA CLINIC 109 | Facility: CLINIC | Age: 68
Setting detail: OPHTHALMOLOGY
End: 2023-04-18
Payer: COMMERCIAL

## 2023-04-18 DIAGNOSIS — H40.033: ICD-10-CM

## 2023-04-18 DIAGNOSIS — H25.13: ICD-10-CM

## 2023-04-18 DIAGNOSIS — H52.7: ICD-10-CM

## 2023-04-18 DIAGNOSIS — H40.053: ICD-10-CM

## 2023-04-18 DIAGNOSIS — H02.834: ICD-10-CM

## 2023-04-18 DIAGNOSIS — H52.13: ICD-10-CM

## 2023-04-18 DIAGNOSIS — H02.832: ICD-10-CM

## 2023-04-18 DIAGNOSIS — H02.831: ICD-10-CM

## 2023-04-18 DIAGNOSIS — H02.835: ICD-10-CM

## 2023-04-18 PROCEDURE — 99213 OFFICE O/P EST LOW 20 MIN: CPT | Performed by: OPHTHALMOLOGY

## 2023-04-18 PROCEDURE — 92015 DETERMINE REFRACTIVE STATE: CPT | Performed by: OPHTHALMOLOGY

## 2023-04-18 ASSESSMENT — PACHYMETRY
OS_CT_CORRECTION: -3
OD_CT_UM: 585
OS_CT_UM: 582
OD_CT_CORRECTION: -3

## 2023-04-18 ASSESSMENT — REFRACTION_AUTOREFRACTION
OD_CYLINDER: -1.75
OS_SPHERE: -1.75
OD_SPHERE: -0.25
OD_AXIS: 101
OS_AXIS: 94
OS_CYLINDER: -1.25

## 2023-04-18 ASSESSMENT — REFRACTION_MANIFEST
OD_SPHERE: -1.00
OS_VA1: 20/30
OD_VA1: 20/20-
OS_SPHERE: -1.00

## 2023-04-18 ASSESSMENT — VISUAL ACUITY
OS_BCVA: 20/20-2
OD_BCVA: 20/30+1

## 2023-04-18 ASSESSMENT — CONFRONTATIONAL VISUAL FIELD TEST (CVF)
OS_FINDINGS: FULL
OD_FINDINGS: FULL

## 2023-04-18 ASSESSMENT — REFRACTION_CURRENTRX
OS_OVR_VA: 20/
OS_SPHERE: -1.00
OS_AXIS: 55
OD_OVR_VA: 20/
OD_AXIS: 48
OS_CYLINDER: -0.25
OD_SPHERE: -1.00
OD_CYLINDER: -0.25

## 2023-04-18 ASSESSMENT — LID POSITION - DERMATOCHALASIS
OD_DERMATOCHALASIS: 1+
OS_DERMATOCHALASIS: 1+

## 2023-04-18 ASSESSMENT — SPHEQUIV_DERIVED
OS_SPHEQUIV: -2.375
OD_SPHEQUIV: -1.125

## 2023-05-09 ENCOUNTER — OFFICE (OUTPATIENT)
Dept: URBAN - METROPOLITAN AREA CLINIC 109 | Facility: CLINIC | Age: 68
Setting detail: OPHTHALMOLOGY
End: 2023-05-09
Payer: COMMERCIAL

## 2023-05-09 DIAGNOSIS — H25.13: ICD-10-CM

## 2023-05-09 DIAGNOSIS — H02.834: ICD-10-CM

## 2023-05-09 DIAGNOSIS — H40.033: ICD-10-CM

## 2023-05-09 DIAGNOSIS — H52.7: ICD-10-CM

## 2023-05-09 DIAGNOSIS — H02.831: ICD-10-CM

## 2023-05-09 DIAGNOSIS — H02.835: ICD-10-CM

## 2023-05-09 DIAGNOSIS — H40.053: ICD-10-CM

## 2023-05-09 DIAGNOSIS — H02.832: ICD-10-CM

## 2023-05-09 PROCEDURE — 99213 OFFICE O/P EST LOW 20 MIN: CPT | Performed by: OPHTHALMOLOGY

## 2023-05-09 ASSESSMENT — LID POSITION - DERMATOCHALASIS
OS_DERMATOCHALASIS: 1+
OD_DERMATOCHALASIS: 1+

## 2023-05-09 ASSESSMENT — PACHYMETRY
OS_CT_CORRECTION: -3
OD_CT_UM: 585
OD_CT_CORRECTION: -3
OS_CT_UM: 582

## 2023-05-09 ASSESSMENT — CONFRONTATIONAL VISUAL FIELD TEST (CVF)
OS_FINDINGS: FULL
OD_FINDINGS: FULL

## 2023-05-09 ASSESSMENT — TONOMETRY
OS_IOP_MMHG: 21
OD_IOP_MMHG: 20

## 2023-05-10 ASSESSMENT — REFRACTION_CURRENTRX
OD_CYLINDER: -0.25
OS_AXIS: 55
OD_SPHERE: -1.00
OD_AXIS: 48
OS_CYLINDER: -0.25
OS_SPHERE: -1.00
OS_OVR_VA: 20/
OD_OVR_VA: 20/

## 2023-05-10 ASSESSMENT — REFRACTION_MANIFEST
OS_VA1: 20/30
OD_VA1: 20/20-
OD_SPHERE: -1.00
OS_SPHERE: -1.50

## 2023-05-10 ASSESSMENT — VISUAL ACUITY
OD_BCVA: 20/50+2
OS_BCVA: 20/20-1

## 2023-05-10 ASSESSMENT — REFRACTION_AUTOREFRACTION
OS_AXIS: 94
OS_CYLINDER: -1.25
OD_CYLINDER: -1.75
OD_AXIS: 101
OS_SPHERE: -1.75
OD_SPHERE: -0.25

## 2023-05-10 ASSESSMENT — SPHEQUIV_DERIVED
OD_SPHEQUIV: -1.125
OS_SPHEQUIV: -2.375

## 2023-07-11 ENCOUNTER — OFFICE (OUTPATIENT)
Dept: URBAN - METROPOLITAN AREA CLINIC 109 | Facility: CLINIC | Age: 68
Setting detail: OPHTHALMOLOGY
End: 2023-07-11
Payer: COMMERCIAL

## 2023-07-11 DIAGNOSIS — H25.13: ICD-10-CM

## 2023-07-11 DIAGNOSIS — H40.033: ICD-10-CM

## 2023-07-11 DIAGNOSIS — H02.834: ICD-10-CM

## 2023-07-11 DIAGNOSIS — H40.053: ICD-10-CM

## 2023-07-11 DIAGNOSIS — H02.831: ICD-10-CM

## 2023-07-11 DIAGNOSIS — H02.835: ICD-10-CM

## 2023-07-11 DIAGNOSIS — H02.832: ICD-10-CM

## 2023-07-11 PROCEDURE — 99213 OFFICE O/P EST LOW 20 MIN: CPT | Performed by: OPHTHALMOLOGY

## 2023-07-11 ASSESSMENT — REFRACTION_CURRENTRX
OD_OVR_VA: 20/
OS_AXIS: 55
OD_AXIS: 48
OS_SPHERE: -1.00
OD_CYLINDER: -0.25
OD_SPHERE: -1.00
OS_OVR_VA: 20/
OS_CYLINDER: -0.25

## 2023-07-11 ASSESSMENT — REFRACTION_MANIFEST
OS_VA1: 20/30
OD_VA1: 20/20-
OS_SPHERE: -1.50
OD_SPHERE: -1.00

## 2023-07-11 ASSESSMENT — REFRACTION_AUTOREFRACTION
OD_SPHERE: -0.50
OS_SPHERE: -1.50
OS_CYLINDER: -1.50
OS_AXIS: 080
OD_AXIS: 098
OD_CYLINDER: -1.75

## 2023-07-11 ASSESSMENT — CONFRONTATIONAL VISUAL FIELD TEST (CVF)
OD_FINDINGS: FULL
OS_FINDINGS: FULL

## 2023-07-11 ASSESSMENT — VISUAL ACUITY
OS_BCVA: 20/20
OD_BCVA: 20/40

## 2023-07-11 ASSESSMENT — PACHYMETRY
OS_CT_UM: 582
OD_CT_UM: 585
OD_CT_CORRECTION: -3
OS_CT_CORRECTION: -3

## 2023-07-11 ASSESSMENT — LID POSITION - DERMATOCHALASIS
OS_DERMATOCHALASIS: 1+
OD_DERMATOCHALASIS: 1+

## 2023-07-11 ASSESSMENT — SPHEQUIV_DERIVED
OS_SPHEQUIV: -2.25
OD_SPHEQUIV: -1.375

## 2023-10-10 ENCOUNTER — OFFICE (OUTPATIENT)
Dept: URBAN - METROPOLITAN AREA CLINIC 109 | Facility: CLINIC | Age: 68
Setting detail: OPHTHALMOLOGY
End: 2023-10-10
Payer: COMMERCIAL

## 2023-10-10 DIAGNOSIS — H02.831: ICD-10-CM

## 2023-10-10 DIAGNOSIS — H25.13: ICD-10-CM

## 2023-10-10 DIAGNOSIS — H40.033: ICD-10-CM

## 2023-10-10 DIAGNOSIS — H02.832: ICD-10-CM

## 2023-10-10 DIAGNOSIS — H02.835: ICD-10-CM

## 2023-10-10 DIAGNOSIS — H40.053: ICD-10-CM

## 2023-10-10 DIAGNOSIS — H02.834: ICD-10-CM

## 2023-10-10 PROCEDURE — 92133 CPTRZD OPH DX IMG PST SGM ON: CPT | Performed by: OPHTHALMOLOGY

## 2023-10-10 PROCEDURE — 92083 EXTENDED VISUAL FIELD XM: CPT | Performed by: OPHTHALMOLOGY

## 2023-10-10 PROCEDURE — 99213 OFFICE O/P EST LOW 20 MIN: CPT | Performed by: OPHTHALMOLOGY

## 2023-10-10 ASSESSMENT — PACHYMETRY
OD_CT_CORRECTION: -3
OS_CT_CORRECTION: -3
OD_CT_UM: 585
OS_CT_UM: 582

## 2023-10-10 ASSESSMENT — REFRACTION_AUTOREFRACTION
OD_CYLINDER: -1.50
OD_AXIS: 96
OS_SPHERE: -1.00
OS_AXIS: 89
OS_CYLINDER: -1.75
OD_SPHERE: +0.25

## 2023-10-10 ASSESSMENT — VISUAL ACUITY
OD_BCVA: 20/30-2
OS_BCVA: 20/20-1

## 2023-10-10 ASSESSMENT — REFRACTION_CURRENTRX
OS_AXIS: 55
OD_OVR_VA: 20/
OD_SPHERE: -1.00
OS_OVR_VA: 20/
OD_CYLINDER: -0.25
OS_SPHERE: -1.00
OD_AXIS: 48
OS_CYLINDER: -0.25

## 2023-10-10 ASSESSMENT — CONFRONTATIONAL VISUAL FIELD TEST (CVF)
OD_FINDINGS: FULL
OS_FINDINGS: FULL

## 2023-10-10 ASSESSMENT — SPHEQUIV_DERIVED
OD_SPHEQUIV: -0.5
OS_SPHEQUIV: -1.875

## 2023-10-10 ASSESSMENT — LID POSITION - DERMATOCHALASIS
OS_DERMATOCHALASIS: 1+
OD_DERMATOCHALASIS: 1+

## 2023-10-10 ASSESSMENT — REFRACTION_MANIFEST
OS_SPHERE: -1.50
OD_VA1: 20/20-
OS_VA1: 20/30
OD_SPHERE: -1.00

## 2023-11-10 ENCOUNTER — OFFICE (OUTPATIENT)
Dept: URBAN - METROPOLITAN AREA CLINIC 109 | Facility: CLINIC | Age: 68
Setting detail: OPHTHALMOLOGY
End: 2023-11-10
Payer: COMMERCIAL

## 2023-11-10 DIAGNOSIS — H02.835: ICD-10-CM

## 2023-11-10 DIAGNOSIS — H02.834: ICD-10-CM

## 2023-11-10 DIAGNOSIS — H25.13: ICD-10-CM

## 2023-11-10 DIAGNOSIS — H40.053: ICD-10-CM

## 2023-11-10 DIAGNOSIS — H02.832: ICD-10-CM

## 2023-11-10 DIAGNOSIS — H40.033: ICD-10-CM

## 2023-11-10 DIAGNOSIS — H02.831: ICD-10-CM

## 2023-11-10 PROCEDURE — 99213 OFFICE O/P EST LOW 20 MIN: CPT | Performed by: OPHTHALMOLOGY

## 2023-11-10 ASSESSMENT — LID POSITION - DERMATOCHALASIS
OS_DERMATOCHALASIS: 1+
OD_DERMATOCHALASIS: 1+

## 2023-11-10 ASSESSMENT — REFRACTION_AUTOREFRACTION
OD_CYLINDER: -1.50
OD_SPHERE: +0.25
OD_AXIS: 96
OS_CYLINDER: -1.75
OS_AXIS: 89
OS_SPHERE: -1.00

## 2023-11-10 ASSESSMENT — REFRACTION_CURRENTRX
OS_CYLINDER: -0.25
OS_OVR_VA: 20/
OS_AXIS: 55
OD_CYLINDER: -0.25
OD_SPHERE: -1.00
OD_AXIS: 48
OD_OVR_VA: 20/
OS_SPHERE: -1.00

## 2023-11-10 ASSESSMENT — REFRACTION_MANIFEST
OS_SPHERE: -1.50
OS_VA1: 20/30
OD_SPHERE: -1.00
OD_VA1: 20/20-

## 2023-11-10 ASSESSMENT — CONFRONTATIONAL VISUAL FIELD TEST (CVF)
OD_FINDINGS: FULL
OS_FINDINGS: FULL

## 2023-11-10 ASSESSMENT — SPHEQUIV_DERIVED
OD_SPHEQUIV: -0.5
OS_SPHEQUIV: -1.875

## 2024-01-10 ENCOUNTER — OFFICE (OUTPATIENT)
Dept: URBAN - METROPOLITAN AREA CLINIC 109 | Facility: CLINIC | Age: 69
Setting detail: OPHTHALMOLOGY
End: 2024-01-10
Payer: COMMERCIAL

## 2024-01-10 DIAGNOSIS — H02.834: ICD-10-CM

## 2024-01-10 DIAGNOSIS — H02.831: ICD-10-CM

## 2024-01-10 DIAGNOSIS — H02.832: ICD-10-CM

## 2024-01-10 DIAGNOSIS — H40.053: ICD-10-CM

## 2024-01-10 DIAGNOSIS — H40.033: ICD-10-CM

## 2024-01-10 DIAGNOSIS — H25.13: ICD-10-CM

## 2024-01-10 DIAGNOSIS — H02.835: ICD-10-CM

## 2024-01-10 PROCEDURE — 99213 OFFICE O/P EST LOW 20 MIN: CPT | Performed by: OPHTHALMOLOGY

## 2024-01-10 PROCEDURE — 92250 FUNDUS PHOTOGRAPHY W/I&R: CPT | Performed by: OPHTHALMOLOGY

## 2024-01-10 PROCEDURE — 92020 GONIOSCOPY: CPT | Performed by: OPHTHALMOLOGY

## 2024-01-10 ASSESSMENT — REFRACTION_CURRENTRX
OD_CYLINDER: -0.25
OS_SPHERE: -1.00
OD_OVR_VA: 20/
OS_AXIS: 55
OS_CYLINDER: -0.25
OD_SPHERE: -1.00
OS_OVR_VA: 20/
OD_AXIS: 48

## 2024-01-10 ASSESSMENT — REFRACTION_AUTOREFRACTION
OD_CYLINDER: -1.50
OD_SPHERE: 0.00
OD_AXIS: 101
OS_SPHERE: -0.75
OS_CYLINDER: -1.50
OS_AXIS: 85

## 2024-01-10 ASSESSMENT — REFRACTION_MANIFEST
OD_VA1: 20/20-
OS_VA1: 20/30
OS_SPHERE: -1.50
OD_SPHERE: -1.00

## 2024-01-10 ASSESSMENT — SPHEQUIV_DERIVED
OS_SPHEQUIV: -1.5
OD_SPHEQUIV: -0.75

## 2024-01-10 ASSESSMENT — LID POSITION - DERMATOCHALASIS
OD_DERMATOCHALASIS: 1+
OS_DERMATOCHALASIS: 1+

## 2024-01-10 ASSESSMENT — CONFRONTATIONAL VISUAL FIELD TEST (CVF)
OS_FINDINGS: FULL
OD_FINDINGS: FULL

## 2024-04-17 ENCOUNTER — OFFICE (OUTPATIENT)
Dept: URBAN - METROPOLITAN AREA CLINIC 109 | Facility: CLINIC | Age: 69
Setting detail: OPHTHALMOLOGY
End: 2024-04-17
Payer: COMMERCIAL

## 2024-04-17 DIAGNOSIS — H40.033: ICD-10-CM

## 2024-04-17 DIAGNOSIS — H25.13: ICD-10-CM

## 2024-04-17 DIAGNOSIS — H40.053: ICD-10-CM

## 2024-04-17 DIAGNOSIS — H02.831: ICD-10-CM

## 2024-04-17 DIAGNOSIS — H02.834: ICD-10-CM

## 2024-04-17 DIAGNOSIS — H02.832: ICD-10-CM

## 2024-04-17 DIAGNOSIS — J01.90: ICD-10-CM

## 2024-04-17 DIAGNOSIS — H02.835: ICD-10-CM

## 2024-04-17 PROCEDURE — 99213 OFFICE O/P EST LOW 20 MIN: CPT | Performed by: OPHTHALMOLOGY

## 2024-04-17 ASSESSMENT — LID POSITION - DERMATOCHALASIS
OS_DERMATOCHALASIS: LLL LUL 1+
OD_DERMATOCHALASIS: RLL RUL 1+

## 2024-07-17 ENCOUNTER — OFFICE (OUTPATIENT)
Dept: URBAN - METROPOLITAN AREA CLINIC 109 | Facility: CLINIC | Age: 69
Setting detail: OPHTHALMOLOGY
End: 2024-07-17
Payer: COMMERCIAL

## 2024-07-17 DIAGNOSIS — H40.033: ICD-10-CM

## 2024-07-17 DIAGNOSIS — H02.834: ICD-10-CM

## 2024-07-17 DIAGNOSIS — H40.053: ICD-10-CM

## 2024-07-17 DIAGNOSIS — H02.831: ICD-10-CM

## 2024-07-17 DIAGNOSIS — J01.90: ICD-10-CM

## 2024-07-17 DIAGNOSIS — H25.13: ICD-10-CM

## 2024-07-17 DIAGNOSIS — H02.832: ICD-10-CM

## 2024-07-17 DIAGNOSIS — H02.835: ICD-10-CM

## 2024-07-17 PROCEDURE — 99213 OFFICE O/P EST LOW 20 MIN: CPT | Performed by: OPHTHALMOLOGY

## 2024-07-17 ASSESSMENT — LID POSITION - DERMATOCHALASIS
OD_DERMATOCHALASIS: RLL RUL 1+
OS_DERMATOCHALASIS: LLL LUL 1+

## 2024-07-17 ASSESSMENT — CONFRONTATIONAL VISUAL FIELD TEST (CVF)
OS_FINDINGS: FULL
OD_FINDINGS: FULL

## 2024-10-17 ENCOUNTER — OFFICE (OUTPATIENT)
Dept: URBAN - METROPOLITAN AREA CLINIC 109 | Facility: CLINIC | Age: 69
Setting detail: OPHTHALMOLOGY
End: 2024-10-17
Payer: COMMERCIAL

## 2024-10-17 DIAGNOSIS — H02.832: ICD-10-CM

## 2024-10-17 DIAGNOSIS — H02.834: ICD-10-CM

## 2024-10-17 DIAGNOSIS — H40.033: ICD-10-CM

## 2024-10-17 DIAGNOSIS — H02.835: ICD-10-CM

## 2024-10-17 DIAGNOSIS — H25.13: ICD-10-CM

## 2024-10-17 DIAGNOSIS — H02.831: ICD-10-CM

## 2024-10-17 DIAGNOSIS — H40.053: ICD-10-CM

## 2024-10-17 DIAGNOSIS — J01.90: ICD-10-CM

## 2024-10-17 PROCEDURE — 99213 OFFICE O/P EST LOW 20 MIN: CPT | Performed by: OPHTHALMOLOGY

## 2024-10-17 PROCEDURE — 92083 EXTENDED VISUAL FIELD XM: CPT | Performed by: OPHTHALMOLOGY

## 2024-10-17 PROCEDURE — 92133 CPTRZD OPH DX IMG PST SGM ON: CPT | Performed by: OPHTHALMOLOGY

## 2024-10-17 ASSESSMENT — PACHYMETRY
OD_CT_CORRECTION: -3
OD_CT_UM: 585
OS_CT_CORRECTION: -3
OS_CT_UM: 582

## 2024-10-17 ASSESSMENT — REFRACTION_AUTOREFRACTION
OS_SPHERE: -1.00
OS_CYLINDER: -2.00
OD_SPHERE: -0.25
OD_AXIS: 105
OD_CYLINDER: -1.50
OS_AXIS: 90

## 2024-10-17 ASSESSMENT — REFRACTION_CURRENTRX
OD_OVR_VA: 20/
OD_SPHERE: -1.00
OS_SPHERE: -1.00
OS_OVR_VA: 20/
OS_OVR_VA: 20/
OD_SPHERE: -1.00
OS_SPHERE: -1.00
OD_OVR_VA: 20/

## 2024-10-17 ASSESSMENT — REFRACTION_MANIFEST
OD_VA1: 20/20
OD_SPHERE: PLANO
OS_SPHERE: -1.75
OS_CYLINDER: -1.00
OS_VA1: 20/20-2
OS_AXIS: 90

## 2024-10-17 ASSESSMENT — TONOMETRY
OS_IOP_MMHG: 21
OD_IOP_MMHG: 20

## 2024-10-17 ASSESSMENT — VISUAL ACUITY
OS_BCVA: 20/20-1
OD_BCVA: 20/30

## 2024-10-17 ASSESSMENT — LID POSITION - DERMATOCHALASIS
OD_DERMATOCHALASIS: RLL RUL 1+
OS_DERMATOCHALASIS: LLL LUL 1+

## 2024-10-17 ASSESSMENT — CONFRONTATIONAL VISUAL FIELD TEST (CVF)
OS_FINDINGS: FULL
OD_FINDINGS: FULL

## 2025-01-16 ENCOUNTER — OFFICE (OUTPATIENT)
Dept: URBAN - METROPOLITAN AREA CLINIC 109 | Facility: CLINIC | Age: 70
Setting detail: OPHTHALMOLOGY
End: 2025-01-16
Payer: COMMERCIAL

## 2025-01-16 DIAGNOSIS — H02.832: ICD-10-CM

## 2025-01-16 DIAGNOSIS — H40.033: ICD-10-CM

## 2025-01-16 DIAGNOSIS — H00.012: ICD-10-CM

## 2025-01-16 DIAGNOSIS — H40.053: ICD-10-CM

## 2025-01-16 DIAGNOSIS — H02.831: ICD-10-CM

## 2025-01-16 DIAGNOSIS — H02.834: ICD-10-CM

## 2025-01-16 DIAGNOSIS — H02.835: ICD-10-CM

## 2025-01-16 DIAGNOSIS — H25.13: ICD-10-CM

## 2025-01-16 PROCEDURE — 92020 GONIOSCOPY: CPT | Performed by: OPHTHALMOLOGY

## 2025-01-16 PROCEDURE — 92250 FUNDUS PHOTOGRAPHY W/I&R: CPT | Performed by: OPHTHALMOLOGY

## 2025-01-16 PROCEDURE — 92014 COMPRE OPH EXAM EST PT 1/>: CPT | Performed by: OPHTHALMOLOGY

## 2025-01-16 ASSESSMENT — VISUAL ACUITY
OD_BCVA: 20/25-2
OS_BCVA: 20/30

## 2025-01-16 ASSESSMENT — REFRACTION_MANIFEST
OS_SPHERE: -0.50
OS_CYLINDER: -1.75
OS_VA1: 20/25-
OD_SPHERE: PLANO
OS_AXIS: 90
OD_VA1: 20/20

## 2025-01-16 ASSESSMENT — REFRACTION_CURRENTRX
OS_SPHERE: -1.00
OD_SPHERE: -1.00
OS_VPRISM_DIRECTION: SV
OS_OVR_VA: 20/
OS_SPHERE: -1.00
OD_VPRISM_DIRECTION: SV
OD_OVR_VA: 20/
OD_SPHERE: -1.00
OD_OVR_VA: 20/
OS_OVR_VA: 20/

## 2025-01-16 ASSESSMENT — CONFRONTATIONAL VISUAL FIELD TEST (CVF)
OS_FINDINGS: FULL
OD_FINDINGS: FULL

## 2025-01-16 ASSESSMENT — KERATOMETRY
OS_AXISANGLE_DEGREES: 164
OS_K2POWER_DIOPTERS: 44.25
OS_K1POWER_DIOPTERS: 43.75
OD_AXISANGLE_DEGREES: 015
OD_K2POWER_DIOPTERS: 44.75
OD_K1POWER_DIOPTERS: 44.25

## 2025-01-16 ASSESSMENT — REFRACTION_AUTOREFRACTION
OD_SPHERE: PL
OS_SPHERE: -1.25
OD_CYLINDER: -1.50
OD_AXIS: 105
OS_CYLINDER: -1.75
OS_AXIS: 089

## 2025-01-16 ASSESSMENT — PACHYMETRY
OD_CT_CORRECTION: -3
OS_CT_UM: 582
OD_CT_UM: 585
OS_CT_CORRECTION: -3

## 2025-01-16 ASSESSMENT — LID POSITION - DERMATOCHALASIS
OD_DERMATOCHALASIS: RLL RUL 1+
OS_DERMATOCHALASIS: LLL LUL 1+

## 2025-01-16 ASSESSMENT — TONOMETRY: OD_IOP_MMHG: 21

## 2025-05-08 ENCOUNTER — OFFICE (OUTPATIENT)
Dept: URBAN - METROPOLITAN AREA CLINIC 109 | Facility: CLINIC | Age: 70
Setting detail: OPHTHALMOLOGY
End: 2025-05-08
Payer: COMMERCIAL

## 2025-05-08 DIAGNOSIS — H02.831: ICD-10-CM

## 2025-05-08 DIAGNOSIS — H02.834: ICD-10-CM

## 2025-05-08 DIAGNOSIS — H40.033: ICD-10-CM

## 2025-05-08 DIAGNOSIS — H25.13: ICD-10-CM

## 2025-05-08 DIAGNOSIS — H02.835: ICD-10-CM

## 2025-05-08 DIAGNOSIS — H02.832: ICD-10-CM

## 2025-05-08 DIAGNOSIS — H40.053: ICD-10-CM

## 2025-05-08 PROCEDURE — 92020 GONIOSCOPY: CPT | Performed by: OPHTHALMOLOGY

## 2025-05-08 PROCEDURE — 99213 OFFICE O/P EST LOW 20 MIN: CPT | Performed by: OPHTHALMOLOGY

## 2025-05-08 ASSESSMENT — CONFRONTATIONAL VISUAL FIELD TEST (CVF)
OD_FINDINGS: FULL
OS_FINDINGS: FULL

## 2025-05-08 ASSESSMENT — REFRACTION_CURRENTRX
OD_OVR_VA: 20/
OD_SPHERE: -1.00
OS_SPHERE: -1.00
OS_OVR_VA: 20/
OD_SPHERE: -1.00
OS_VPRISM_DIRECTION: SV
OD_OVR_VA: 20/
OS_OVR_VA: 20/
OD_VPRISM_DIRECTION: SV
OS_SPHERE: -1.00

## 2025-05-08 ASSESSMENT — REFRACTION_MANIFEST
OD_CYLINDER: -1.25
OD_VA1: 20/20-2
OD_AXIS: 105
OS_VA1: 20/20-
OS_SPHERE: -1.50
OD_SPHERE: +0.50
OS_AXIS: 85
OS_CYLINDER: -2.00

## 2025-05-08 ASSESSMENT — LID POSITION - DERMATOCHALASIS
OD_DERMATOCHALASIS: RLL RUL 1+
OS_DERMATOCHALASIS: LLL LUL 1+

## 2025-05-08 ASSESSMENT — KERATOMETRY
OS_K1POWER_DIOPTERS: 43.75
OS_AXISANGLE_DEGREES: 164
OD_K2POWER_DIOPTERS: 44.75
OS_K2POWER_DIOPTERS: 44.25
OD_AXISANGLE_DEGREES: 015
OD_K1POWER_DIOPTERS: 44.25

## 2025-05-08 ASSESSMENT — TONOMETRY
OS_IOP_MMHG: 21
OD_IOP_MMHG: 21

## 2025-05-08 ASSESSMENT — PACHYMETRY
OS_CT_CORRECTION: -3
OS_CT_UM: 582
OD_CT_CORRECTION: -3
OD_CT_UM: 585

## 2025-05-08 ASSESSMENT — REFRACTION_AUTOREFRACTION
OS_CYLINDER: -1.75
OD_CYLINDER: -1.50
OD_AXIS: 105
OS_AXIS: 089
OS_SPHERE: -1.25
OD_SPHERE: PL

## 2025-05-08 ASSESSMENT — VISUAL ACUITY
OD_BCVA: 20/25-2
OS_BCVA: 20/30